# Patient Record
Sex: FEMALE | Race: ASIAN | Employment: UNEMPLOYED | URBAN - METROPOLITAN AREA
[De-identification: names, ages, dates, MRNs, and addresses within clinical notes are randomized per-mention and may not be internally consistent; named-entity substitution may affect disease eponyms.]

---
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
## 2022-01-18 ENCOUNTER — OFFICE VISIT (OUTPATIENT)
Dept: PRIMARY CARE CLINIC | Age: 22
End: 2022-01-18
Payer: COMMERCIAL

## 2022-01-18 VITALS
HEIGHT: 64 IN | RESPIRATION RATE: 15 BRPM | OXYGEN SATURATION: 97 % | BODY MASS INDEX: 23.76 KG/M2 | HEART RATE: 75 BPM | WEIGHT: 139.2 LBS | DIASTOLIC BLOOD PRESSURE: 80 MMHG | TEMPERATURE: 97.8 F | SYSTOLIC BLOOD PRESSURE: 113 MMHG

## 2022-01-18 DIAGNOSIS — Z00.00 PHYSICAL EXAM: Primary | ICD-10-CM

## 2022-01-18 DIAGNOSIS — Z11.59 NEED FOR HEPATITIS C SCREENING TEST: ICD-10-CM

## 2022-01-18 DIAGNOSIS — E55.9 VITAMIN D DEFICIENCY: ICD-10-CM

## 2022-01-18 PROCEDURE — 99395 PREV VISIT EST AGE 18-39: CPT | Performed by: INTERNAL MEDICINE

## 2022-01-18 RX ORDER — BISMUTH SUBSALICYLATE 262 MG
1 TABLET,CHEWABLE ORAL DAILY
COMMUNITY

## 2022-01-18 RX ORDER — GLUCOSAMINE SULFATE 1500 MG
POWDER IN PACKET (EA) ORAL DAILY
COMMUNITY

## 2022-01-18 NOTE — PROGRESS NOTES
Della Lala (: 2000) is a 24 y.o. female, established patient, here for evaluation of the following chief complaint(s):  Physical     Written by Jennifer Jin, as dictated by Dr. Arian Cruz MD.      ASSESSMENT/PLAN:  Below is the assessment and plan developed based on review of pertinent history, physical exam, labs, studies, and medications. 1. Physical exam  Her physical exam today was normal. Ordered fasting labs for patient to complete today in office. Waiting on results. -     METABOLIC PANEL, COMPREHENSIVE; Future  -     CBC W/O DIFF; Future  -     LIPID PANEL; Future  -     TSH 3RD GENERATION; Future    2. Need for hepatitis C screening test  Will check hepatitis C AB.  -     HEPATITIS C AB; Future    3. Vitamin D deficiency  Will check vitamin D level. Continue OTC vitamin D3 daily. -     VITAMIN D, 25 HYDROXY; Future      SUBJECTIVE/OBJECTIVE:  HPI   The patient presents today for her annual physical exam. She is currently in her last year at [employer]. She is not having any acute complaints other than concerned about her recent weight gain. She is not followed by GYN, but is not sexually active. She takes vitamin D3 daily. Current Outpatient Medications on File Prior to Visit   Medication Sig Dispense Refill    multivitamin (ONE A DAY) tablet Take 1 Tablet by mouth daily.  cholecalciferol (Vitamin D3) 25 mcg (1,000 unit) cap Take  by mouth daily.  Mening Vac A,C,Y,W135 Dip, PF, (MENACTRA) 4 mcg/0.5 mL soln solution 0.5 mL by IntraMUSCular route PRIOR TO DISCHARGE for 1 dose. (Patient not taking: Reported on 2022) 1 Vial 0     No current facility-administered medications on file prior to visit. No Known Allergies    History reviewed. No pertinent past medical history. History reviewed. No pertinent surgical history. History reviewed. No pertinent family history.     Social History     Socioeconomic History    Marital status: SINGLE     Spouse name: Not on file    Number of children: Not on file    Years of education: Not on file    Highest education level: Not on file   Occupational History    Not on file   Tobacco Use    Smoking status: Never Smoker    Smokeless tobacco: Never Used   Substance and Sexual Activity    Alcohol use: No    Drug use: No    Sexual activity: Never   Other Topics Concern    Not on file   Social History Narrative    Not on file       No visits with results within 3 Month(s) from this visit. 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  Rubella Ab, IgG 07/02/2018 10.00  Immune >0.99 index Final    Comment:                                 Non-immune       <0.90                                  Equivocal  0.90 - 0.99                                  Immune           >0.99      Rubeola Ab, IgG 07/02/2018 <25.0* Immune >29.9 AU/mL Final    Comment:                                  Negative        <25.0                                   Equivocal 25.0 - 29.9                                   Positive        >29.9  Presence of antibodies to Rubeola is presumptive evidence  of immunity except when acute infection is suspected.  Mumps Abs, IgG 07/02/2018 47.5  Immune >10.9 AU/mL Final    Comment:                                 Negative         <9.0                                  Equivocal  9.0 - 10.9                                  Positive        >10.9  A positive result generally indicates past exposure to  Mumps virus or previous vaccination.  QuantiFERON TB Gold 07/02/2018 Negative  Negative Final    QUANTIFERON CRITERIA 07/02/2018 Comment   Final    Comment:  To be considered positive a specimen should have a TB Ag minus Nil  value greater than or equal to 0.35 IU/mL and in addition the TB Ag  minus Nil value must be greater than or equal to 25% of the Nil  value. There may be insufficient information in these values to  differentiate between some negative and some indeterminate test  values.  QuantiFERON TB Ag Value 07/02/2018 0.13  IU/mL Final    QuantiFERON Nil Value 07/02/2018 0.07  IU/mL Final    QuantiFERON Mitogen Value 07/02/2018 7.15  IU/mL Final    QFT TB Ag minus Nil Value 07/02/2018 0.06  IU/mL Final    Interpretation: 07/02/2018 Comment   Final    Comment: The QuantiFERON TB Gold (in Tube) assay is intended for use as an aid  in the diagnosis of TB infection. Negative results suggest that there  is no TB infection. In patients with high suspicion of exposure, a  negative test should be repeated. A positive test indicates infection  with Mycobacterium tuberculosis. Among individuals without  tuberculosis infection, a positive test may be due to exposure to  [place], M. szshelleygai or M. marinum. On the Internet, go to  cdc.gov/tb for further details. Review of Systems   Constitutional: Negative for activity change, fatigue and unexpected weight change. HENT: Negative for congestion, hearing loss, rhinorrhea and sore throat. Eyes: Negative for discharge. Respiratory: Negative for cough, chest tightness and shortness of breath. Cardiovascular: Negative for leg swelling. Gastrointestinal: Negative for abdominal pain, constipation and diarrhea. Genitourinary: Negative for dysuria, flank pain, frequency and urgency. Musculoskeletal: Negative for arthralgias, back pain and myalgias. Skin: Negative for color change and rash. Neurological: Negative for dizziness, light-headedness and headaches. Psychiatric/Behavioral: Negative for dysphoric mood and sleep disturbance. The patient is not nervous/anxious.       Visit Vitals  /80 (BP 1 Location: Left arm)   Pulse 75   Temp 97.8 °F (36.6 °C)   Resp 15   Ht 5' 4\" (1.626 m)   Wt 139 lb 3.2 oz (63.1 kg)   LMP 01/14/2022 (Exact Date)   SpO2 97%   BMI 23.89 kg/m²      Physical Exam  Vitals and nursing note reviewed. Constitutional:       General: She is not in acute distress. Appearance: Normal appearance. She is normal weight. She is not diaphoretic. HENT:      Right Ear: Tympanic membrane, ear canal and external ear normal.      Left Ear: Tympanic membrane, ear canal and external ear normal.      Mouth/Throat:      Mouth: Mucous membranes are moist.      Pharynx: Oropharynx is clear. Eyes:      General:         Right eye: No discharge. Left eye: No discharge. Extraocular Movements: Extraocular movements intact. Conjunctiva/sclera: Conjunctivae normal.   Neck:      Thyroid: No thyromegaly. Cardiovascular:      Rate and Rhythm: Normal rate and regular rhythm. Pulses: Normal pulses. Dorsalis pedis pulses are 2+ on the right side and 2+ on the left side. Pulmonary:      Effort: Pulmonary effort is normal.      Breath sounds: Normal breath sounds. No wheezing. Abdominal:      General: Bowel sounds are normal. There is no distension. Palpations: Abdomen is soft. Tenderness: There is no abdominal tenderness. Musculoskeletal:      Right lower leg: No edema. Left lower leg: No edema. Comments: B/L knees without crepitus   Lymphadenopathy:      Cervical: No cervical adenopathy. Neurological:      Deep Tendon Reflexes:      Reflex Scores:       Patellar reflexes are 2+ on the right side and 2+ on the left side. Psychiatric:         Mood and Affect: Mood and affect normal.         An electronic signature was used to authenticate this note.   -- Vishnu Simon

## 2022-01-18 NOTE — PROGRESS NOTES
Chief Complaint   Patient presents with    Physical       Visit Vitals  /80 (BP 1 Location: Left arm)   Pulse 75   Temp 97.8 °F (36.6 °C)   Resp 15   Ht 5' 4\" (1.626 m)   Wt 139 lb 3.2 oz (63.1 kg)   LMP 01/14/2022 (Exact Date)   SpO2 97%   BMI 23.89 kg/m²       1. Have you been to the ER, urgent care clinic since your last visit? Hospitalized since your last visit? No    2. Have you seen or consulted any other health care providers outside of the [address] [place] [address] since your last visit? Include any pap smears or colon screening.  Yes Eye Doctor

## 2023-05-22 RX ORDER — NEISSERIA MENINGITIDIS GROUP A CAPSULAR POLYSACCHARIDE DIPHTHERIA TOXOID CONJUGATE ANTIGEN, NEISSERIA MENINGITIDIS GROUP C CAPSULAR POLYSACCHARIDE DIPHTHERIA TOXOID CONJUGATE ANTIGEN, NEISSERIA MENINGITIDIS GROUP Y CAPSULAR POLYSACCHARIDE DIPHTHERIA TOXOID CONJUGATE ANTIGEN, AND NEISSERIA MENINGITIDIS GROUP W-135 CAPSULAR POLYSACCHARIDE DIPHTHERIA TOXOID CONJUGATE ANTIGEN 4; 4; 4; 4 UG/.5ML; UG/.5ML; UG/.5ML; UG/.5ML
0.5 INJECTION, SOLUTION INTRAMUSCULAR
COMMUNITY
Start: 2018-07-02